# Patient Record
Sex: FEMALE | Race: BLACK OR AFRICAN AMERICAN
[De-identification: names, ages, dates, MRNs, and addresses within clinical notes are randomized per-mention and may not be internally consistent; named-entity substitution may affect disease eponyms.]

---

## 2019-04-10 ENCOUNTER — HOSPITAL ENCOUNTER (OUTPATIENT)
Dept: HOSPITAL 5 - GIO | Age: 64
Discharge: HOME | End: 2019-04-10
Attending: INTERNAL MEDICINE
Payer: OTHER GOVERNMENT

## 2019-04-10 VITALS — SYSTOLIC BLOOD PRESSURE: 113 MMHG | DIASTOLIC BLOOD PRESSURE: 71 MMHG

## 2019-04-10 DIAGNOSIS — Z98.890: ICD-10-CM

## 2019-04-10 DIAGNOSIS — Z79.899: ICD-10-CM

## 2019-04-10 DIAGNOSIS — Z98.891: ICD-10-CM

## 2019-04-10 DIAGNOSIS — Z80.0: ICD-10-CM

## 2019-04-10 DIAGNOSIS — Z12.11: Primary | ICD-10-CM

## 2019-04-10 DIAGNOSIS — K64.8: ICD-10-CM

## 2019-04-10 DIAGNOSIS — K21.9: ICD-10-CM

## 2019-04-10 DIAGNOSIS — Z90.710: ICD-10-CM

## 2019-04-10 DIAGNOSIS — Z86.010: ICD-10-CM

## 2019-04-10 PROCEDURE — 45378 DIAGNOSTIC COLONOSCOPY: CPT

## 2019-04-10 NOTE — ANESTHESIA DAY OF SURGERY
Anesthesia Day of Surgery





- Day of Surgery


Patient H&P Reviewed: Yes


Patient is NPO: Yes


Beta Blockers: No


Cardiac Clearance: No


Pulmonary Clearance: No


Adrian's Test: N/A

## 2019-04-10 NOTE — PROCEDURE NOTE
Date of procedure: 04/10/19


Pre-op diagnosis: H/O Colon Polyps/ F/H/O Colon Cancer


Post-op diagnosis: other (No Colon Polyps noted/ No Diverticuli noted/ 

Minor,Internal Hemorrhoid)


Procedure: 





Colonoscopy


Anesthesia: MAC


Surgeon: YULIA PATEL


Estimated blood loss: none


Pathology: none


Condition: stable


Disposition: same day (Resume home medication and follow up in 1 to 2 weeks 

([phone]).)

## 2019-04-10 NOTE — ANESTHESIA CONSULTATION
Anesthesia Consult and Med Hx





- Airway


Anesthetic Teeth Evaluation: Good


ROM Head & Neck: Adequate


Mental/Hyoid Distance: Adequate


Mallampati Class: Class II


Intubation Access Assessment: Good





- Pulmonary Exam


CTA: Yes





- Cardiac Exam


Cardiac Exam: RRR





- Pre-Operative Health Status


ASA Pre-Surgery Classification: ASA2


Proposed Anesthetic Plan: General, MAC





- Gastrointestinal


Hx Gastroesophageal Reflux Disease: Yes





- Other Systems


Hx Cancer: Yes (H/O Colon Polyps, Family H/O Colon Ca)

## 2019-04-10 NOTE — OPERATIVE REPORT
PROCEDURE:  Colonoscopy.



INDICATIONS:  A 63-year-old  female, who has a strong family history of

cancer.  Two of her brothers have had colon cancer.  She has a history of colon

polyp.  Last colonoscopy was 7 years ago.  Colonoscopy was done as a repeat

colonoscopy as part of colon polyp screening and to see if there was any

recurrence of any polyps.



DESCRIPTION OF PROCEDURE:  Procedure was done after getting informed consent

with MAC anesthesia.  Initial rectal exam was unremarkable.  Instrument was

passed through the rectum onto the cecum, which was identified by ileocecal

valve and the appendiceal orifice.  The terminal ileum was intubated showed

normal mucosa.  Visualization was fair to good.  Cecum, ascending colon,

transverse colon, descending colon, and sigmoid showed normal mucosa.  There was

no evidence of any polyps, colitis or diverticular disease and the rectum showed

some minor internal hemorrhoids on the retroverted view.



ASSESSMENT:  History of colon polyps, strong family history of colon cancer.  No

colon polyps at present.  Minor internal hemorrhoid.  No diverticular disease.



PLAN:  To resume previous medication.  Have the patient follow up in the office

in 1-2 weeks' time. Nurse Renea Kirby was in the room throughout the entirety
of the 

procedure.





DD: 04/10/2019 09:57

DT: 04/10/2019 10:27

JOB# [id number]  [id number]

MARTY/BRETT COOPER